# Patient Record
Sex: FEMALE | Race: WHITE | NOT HISPANIC OR LATINO | Employment: FULL TIME | URBAN - METROPOLITAN AREA
[De-identification: names, ages, dates, MRNs, and addresses within clinical notes are randomized per-mention and may not be internally consistent; named-entity substitution may affect disease eponyms.]

---

## 2018-01-18 ENCOUNTER — HOSPITAL ENCOUNTER (OUTPATIENT)
Dept: RADIOLOGY | Facility: HOSPITAL | Age: 43
Discharge: HOME | End: 2018-01-18
Attending: FAMILY MEDICINE
Payer: COMMERCIAL

## 2018-01-18 ENCOUNTER — OFFICE VISIT (OUTPATIENT)
Dept: OBSTETRICS AND GYNECOLOGY | Facility: CLINIC | Age: 43
End: 2018-01-18
Payer: COMMERCIAL

## 2018-01-18 VITALS — WEIGHT: 199 LBS | BODY MASS INDEX: 36.62 KG/M2 | HEIGHT: 62 IN

## 2018-01-18 VITALS
HEIGHT: 62 IN | DIASTOLIC BLOOD PRESSURE: 90 MMHG | BODY MASS INDEX: 36.71 KG/M2 | SYSTOLIC BLOOD PRESSURE: 138 MMHG | WEIGHT: 199.5 LBS

## 2018-01-18 DIAGNOSIS — Z12.31 SCREENING MAMMOGRAM, ENCOUNTER FOR: ICD-10-CM

## 2018-01-18 DIAGNOSIS — Z12.31 SCREENING MAMMOGRAM, ENCOUNTER FOR: Primary | ICD-10-CM

## 2018-01-18 PROCEDURE — 77067 SCR MAMMO BI INCL CAD: CPT | Mod: TC,PN

## 2018-01-18 PROCEDURE — 99999 PR PBB SHADOW E&M-EST. PATIENT-LVL III: CPT | Mod: PBBFAC,,, | Performed by: OBSTETRICS & GYNECOLOGY

## 2018-01-18 PROCEDURE — 99203 OFFICE O/P NEW LOW 30 MIN: CPT | Mod: S$GLB,,, | Performed by: OBSTETRICS & GYNECOLOGY

## 2018-01-18 PROCEDURE — 77063 BREAST TOMOSYNTHESIS BI: CPT | Mod: 26,,, | Performed by: RADIOLOGY

## 2018-01-18 PROCEDURE — 77067 SCR MAMMO BI INCL CAD: CPT | Mod: 26,,, | Performed by: RADIOLOGY

## 2018-01-18 RX ORDER — ESTRADIOL 2 MG/1
2 TABLET ORAL DAILY
COMMUNITY
Start: 2018-01-11 | End: 2019-01-24 | Stop reason: SDUPTHER

## 2018-01-18 NOTE — PROGRESS NOTES
"Chief Complaint   Patient presents with    Annual Exam       History of Present Illness   42 y.o.  female patient presents today for transfer of care, s/p Total abdominal hysterectomy with  bilateral salpingo-oophorectomy 8 weeks ago, benign disease. On estradiol since, has some lingering mood changes, counseled. Will continue for now. Overdue for mammogram.     Past medical and surgical history reviewed.   I have reviewed the patient's medical history in detail and updated the computerized patient record.    Review of patient's allergies indicates:   Allergen Reactions    Norco [hydrocodone-acetaminophen] Nausea And Vomiting         Review of Systems - Negative except HPI  History obtained from the patient  GEN ROS: negative  Breast ROS: negative for breast lumps  Genito-Urinary ROS: no dysuria, trouble voiding, or hematuria      Physical Examination:  BP (!) 138/90   Ht 5' 2" (1.575 m)   Wt 90.5 kg (199 lb 8.3 oz)   LMP  (LMP Unknown)   BMI 36.49 kg/m²    Constitutional: She is oriented to person, place, and time. She appears well-developed and well-nourished. No distress. overweight  HENT:   Head: Normocephalic and atraumatic.   Eyes: Conjunctivae and EOM are normal. No scleral icterus.   Neck: Normal range of motion. Neck supple. No tracheal deviation present.   Cardiovascular: Normal rate.    Pulmonary/Chest: Effort normal. No respiratory distress. She exhibits no tenderness.  Breasts: are symmetrical.   Right breast exhibits no inverted nipple, no mass, no nipple discharge, no skin change and no tenderness.   Left breast exhibits no inverted nipple, no mass, no nipple discharge, no skin change and no tenderness.  Abdominal: Soft. She exhibits no distension and no mass. There is no tenderness. There is no rebound and no guarding. incision healed well.  Genitourinary: deferred  Musculoskeletal: Normal range of motion.    Neurological: She is alert and oriented to person, place, and time. " Coordination normal.   Skin: Skin is warm and dry. She is not diaphoretic.   Psychiatric: She has a normal mood and affect.          Assessment:  Recent hysterectomy with BSO  Labile mood - reassured  1. Screening mammogram, encounter for         Plan:  conitnue estradiol  Mammogram today  Follow up 1 year or as needed  Patient informed will be contacted with results within 2 weeks. Encouraged to please call back or email if she has not heard from us by then.

## 2019-01-24 RX ORDER — ESTRADIOL 2 MG/1
2 TABLET ORAL DAILY
Qty: 30 TABLET | Refills: 0 | Status: SHIPPED | OUTPATIENT
Start: 2019-01-24 | End: 2019-08-19 | Stop reason: SDUPTHER

## 2019-01-24 NOTE — TELEPHONE ENCOUNTER
Pt requesting refill on estradiol to be sent to [facility].  Scheduled WWE with you on 01/31/2019.  Last WWE on 01/18/2018.

## 2019-01-24 NOTE — TELEPHONE ENCOUNTER
----- Message from Emi Cassidy sent at 1/24/2019 12:33 PM CST -----  Contact: self   Patient need a refill on Estradial 90 day supply please send to [facility] mail [facility], any questions please call back at [phone] (home)     [facility] - [address]  [address]  [facility] [address]  Phone: [phone] Fax: [phone]